# Patient Record
Sex: FEMALE | URBAN - METROPOLITAN AREA
[De-identification: names, ages, dates, MRNs, and addresses within clinical notes are randomized per-mention and may not be internally consistent; named-entity substitution may affect disease eponyms.]

---

## 2019-01-29 ENCOUNTER — APPOINTMENT (RX ONLY)
Dept: URBAN - METROPOLITAN AREA CLINIC 17 | Facility: CLINIC | Age: 69
Setting detail: DERMATOLOGY
End: 2019-01-29

## 2019-01-29 DIAGNOSIS — Z41.9 ENCOUNTER FOR PROCEDURE FOR PURPOSES OTHER THAN REMEDYING HEALTH STATE, UNSPECIFIED: ICD-10-CM

## 2019-01-29 PROCEDURE — ? OTHER (COSMETIC)

## 2019-01-29 PROCEDURE — ? MEDICAL CONSULTATION: FILLERS

## 2019-01-29 PROCEDURE — ? MEDICAL CONSULTATION: DYNAMIC RHYTIDES

## 2019-01-29 NOTE — PROCEDURE: OTHER (COSMETIC)
Other (Free Text): Discussed patients overall concerns which were mainly jowels and around the eye with bags. Discussed that we could utilize fillers to try and help out with this utilizing voluma, defyne, and refyne along with botox and dysport. I discussed I really think her better results would be had utilizing surgical approaches needing a face/neck lift as well as lower blepharoplasty. I gave her a quote of what I would utilize if doing non-invasive approach but recommended consideration of consult with Dr. Ibanez.
Detail Level: Zone

## 2019-03-21 ENCOUNTER — APPOINTMENT (RX ONLY)
Dept: URBAN - METROPOLITAN AREA CLINIC 17 | Facility: CLINIC | Age: 69
Setting detail: DERMATOLOGY
End: 2019-03-21

## 2019-03-21 DIAGNOSIS — Z41.9 ENCOUNTER FOR PROCEDURE FOR PURPOSES OTHER THAN REMEDYING HEALTH STATE, UNSPECIFIED: ICD-10-CM

## 2019-03-21 PROCEDURE — ? COSMETIC CONSULTATION: LASER RESURFACING

## 2019-03-21 PROCEDURE — ? PATIENT SPECIFIC COUNSELING

## 2019-03-21 PROCEDURE — ? COSMETIC CONSULTATION: BLEPHAROPLASTY

## 2019-05-28 ENCOUNTER — APPOINTMENT (RX ONLY)
Dept: URBAN - METROPOLITAN AREA CLINIC 41 | Facility: CLINIC | Age: 69
Setting detail: DERMATOLOGY
End: 2019-05-28

## 2019-05-28 DIAGNOSIS — Z41.9 ENCOUNTER FOR PROCEDURE FOR PURPOSES OTHER THAN REMEDYING HEALTH STATE, UNSPECIFIED: ICD-10-CM

## 2019-05-28 PROCEDURE — ? PATIENT SPECIFIC COUNSELING

## 2019-06-11 ENCOUNTER — APPOINTMENT (RX ONLY)
Dept: URBAN - METROPOLITAN AREA CLINIC 41 | Facility: CLINIC | Age: 69
Setting detail: DERMATOLOGY
End: 2019-06-11

## 2019-06-11 DIAGNOSIS — Z41.9 ENCOUNTER FOR PROCEDURE FOR PURPOSES OTHER THAN REMEDYING HEALTH STATE, UNSPECIFIED: ICD-10-CM

## 2019-06-11 PROCEDURE — ? LASER RESURFACING

## 2019-06-11 PROCEDURE — ? OTHER (COSMETIC)

## 2019-06-11 ASSESSMENT — LOCATION ZONE DERM: LOCATION ZONE: FACE

## 2019-06-11 ASSESSMENT — LOCATION DETAILED DESCRIPTION DERM: LOCATION DETAILED: RIGHT MEDIAL FOREHEAD

## 2019-06-11 ASSESSMENT — LOCATION SIMPLE DESCRIPTION DERM: LOCATION SIMPLE: RIGHT FOREHEAD

## 2019-06-11 NOTE — PROCEDURE: OTHER (COSMETIC)
Other (Free Text): Cosmetic :  Excess crepe-like skin distributed on the upper and lower eyelids\\n\\nPlan : Blepharoplasty.Procedure: bilateral upper and lower eyelid blepharoplasty\\n\\nLocation: upper and lower eyelids\\n\\nPreop Diagnosis: dermatochalasis\\n\\nPostop Diagnosis: same\\n\\nAnesthesia: IV sedation (see seperate note) and 1% lidocaine with epinephrine (10cc)\\n\\nComplications: none\\n\\nEstimated Blood Loss: minimal\\n\\nJustification: The patient presents for bilateral upper and lower eyelid blepharoplasty because of the following: fullness andfolds in the upper and lower eyelids and fullness in upper and lower eyelids.\\n\\nOperative Note:The risks, benefits and alternatives of blepharoplasty were discussed with the patient. Consent forms were signed. In the preoperative area with the patient positioned upright on the surgical chair, a marker pen was used to delineate the natural lid creases in the affected lids, and the amount of redundant skin and/or adipose tissue to be removed utilizing a pinch technique. An intravenous line was established and cardiac monitors were placed. IV sedation, subcutaneous infiltration of central aspect of the upper lids and the lateral canthus, and subcutaneous infiltration of central aspect of the lower lids and the lateral canthus were employed to obtain adequate anesthesia.  The patient was prepped, drapes were placed in the usual sterile fashion. A 15 blade was used to make a skin incision along the elliptical demarcation and to excise the skin. Electrocautery was used to achieve hemostasis. The supplemental oxygen was moved aside during the use of the Bovie to ensure they did not come in close contact with one another. The skin edges were closed using 6-0 fast absorbing gut. The subcilliary incision wascarried deep to identify the sub-orbital fat pads. Once located, the fat pads were meticulously removed. Care was taken to avoid the tear duct punctae. Hemostasis was achieved using electrocautery. The skin edges were closed with epidermal sutures 6-0 Fast gut. Vaseline was applied to the incision line. \\n\\Antonio the conclusion of the procedure, the patient left the operating room in good condition. The patient was advised to call immediately for any pain, lid swelling or tenderness, discharge, or loss of vision. The patient will return in one day for wound cleaning and postoperative exam
Detail Level: Simple
Detail Level: Zone
Other (Free Text): Cosmetic: loosened excess sagging skin at the neck\\n\\nPlan: Face Lift\\nPreoperative Diagnosis: Cosmetic\\nPostoperative Diagnosis: Cosmetic\\n\\nProcedure: Face Lift\\nSurgeon: Dr. Ibanez\\nAssistant: CAMRYN Elizalde RN, NASIR Lr, CMA, BRII Thompson, [facility]\\nAnesthesia: IV sedation, local anesthesia, and 1% lidocaine with epinephrine\\nVolume of Local Anesthesia: 15cc\\nEstimated Blood Loss: minimal\\nComplications: none\\n\\n\\nDescription of Procedure:\\nThe risks, benefits, expectations and alternatives of a face lift were discussed with the patient including, but not limited to, infection, bleeding, injury to nerves, blood vessels or other structures, delayed healing, visible scarring, contour irregularities, asymmetry, cosmetic dissatisfaction and unplanned return to the operating room. The patient read and signed the consent form and verbalized full understanding. The patient was brought to the operating room and the planned incision was drawn around the ear. The patient was placed in the standard supine position in the usual manner. After placement of monitors, anesthesia was achieved as above uneventfully. Surgical site preparation was performed with Hibiclens. The pre-auricualr retro-tragal incision was fashioned in the previously drawn line with a number 15 scalpel, carried posteriorly around the ear lobule to the post auricular sulcus. As necessary the incision was carried down the posterior hairline. A skin flap of the lateral cheek was raised superficial to the SMAS fascia around to the mastoid fascia posteriorly.The flap was dissected above the level of the SMAS until the limit of dissection was reached. Care was paid to avoidance of injury to the facial nerve and its branches, to the greater auricular and spinal accessory nerves. This sequence was conducted first on the right side an then the left. Using multiple 2-0 Ethibond sutures, the SMAS fascia was plicated and anchored superiorly to the zygomatic britney-osteum.\\n\\nThe skin was then redraped on both sides, creating a sharp, cervico-mental angle, while minimizing undue tension on the skin flaps. Key sutures were placed near the anterior hairline and the apex of the posterior hairline. A slit was fashioned for delivery of the ear lobule, being careful to avoid pixie ear deformity. The redundant skin was then carefully and precisely marked, and sharply resected to match the britney auricular incision. . Hemostasis was achieved with electrocautery. Preauricular closure was performed with deep dermal sutures of 4-0 Vicryl. The preauricualar epidermal closure was preformed with staples and 4-0 Polene. Postauricular closure was performed with deep dermal sutures of 4-0 Vicryl, 4-0 Prolene, and Staples. The skin throughout had good color and vascularity.  \\n\\nThe patient tolerated the procedure well without complications. She recovered well with out complications. She recovered from anesthesia and appropriate bandges and dressing were applied. She was discharged accompanied with a responsible adult.\\n\\nPostcare Instructions were given: Please follow the following instructions after your face lift. The patient should sleep in an upright position for the first 48 hours and avoid strenuous activity. Do not engage in lifting over 5 lbs. of weight. Refrain from aerobic exercises. Please call the office should you experience fevers (temperature over 101.5), chills, unusual swelling, severe pain, bleeding that does not stop with gentle pressure, foul smelling drainage, weakness of muscles of facial expression, nausea, vomiting, leg swelling, shortness of breath, chest pain or difficulty with urination.

## 2019-06-11 NOTE — PROCEDURE: LASER RESURFACING
Wavelength: 10,600nm
Anesthesia Type: 1% lidocaine with epinephrine
Power (Sneed): 17
Laser Type: eCO2
Pulse Duration (Usec): 0
Location Override (Will Override All Body Touches In The Note Only): the face
Treatment Number: 1
Consent: Written consent obtained, risks reviewed including but not limited to crusting, scabbing, blistering, scarring, darker or lighter pigmentary change, incomplete improvement of dyschromia, wrinkles, prolonged erythema and facial swelling, infection and bleeding.
Post-Care Instructions: I reviewed with the patient in detail post-care instructions. Patient should avoid sun until area fully healed. Written and verbal wound care instructions were given.
Energy(Mj): 120
Detail Level: Zone
Number Of Passes: 2

## 2019-06-12 ENCOUNTER — APPOINTMENT (RX ONLY)
Dept: URBAN - METROPOLITAN AREA CLINIC 41 | Facility: CLINIC | Age: 69
Setting detail: DERMATOLOGY
End: 2019-06-12

## 2019-06-12 VITALS — DIASTOLIC BLOOD PRESSURE: 72 MMHG | SYSTOLIC BLOOD PRESSURE: 142 MMHG | OXYGEN SATURATION: 94 % | HEART RATE: 64 BPM

## 2019-06-12 VITALS — SYSTOLIC BLOOD PRESSURE: 118 MMHG | DIASTOLIC BLOOD PRESSURE: 62 MMHG | HEART RATE: 79 BPM

## 2019-06-12 VITALS — SYSTOLIC BLOOD PRESSURE: 124 MMHG | DIASTOLIC BLOOD PRESSURE: 64 MMHG | HEART RATE: 66 BPM | OXYGEN SATURATION: 96 %

## 2019-06-12 VITALS — OXYGEN SATURATION: 96 % | DIASTOLIC BLOOD PRESSURE: 77 MMHG | SYSTOLIC BLOOD PRESSURE: 127 MMHG | HEART RATE: 66 BPM

## 2019-06-12 VITALS — SYSTOLIC BLOOD PRESSURE: 141 MMHG | DIASTOLIC BLOOD PRESSURE: 87 MMHG | OXYGEN SATURATION: 94 % | HEART RATE: 77 BPM

## 2019-06-12 DIAGNOSIS — Z41.9 ENCOUNTER FOR PROCEDURE FOR PURPOSES OTHER THAN REMEDYING HEALTH STATE, UNSPECIFIED: ICD-10-CM

## 2019-06-12 PROCEDURE — 99024 POSTOP FOLLOW-UP VISIT: CPT

## 2019-06-12 PROCEDURE — ? COSMETIC FOLLOW-UP

## 2019-06-13 ENCOUNTER — APPOINTMENT (RX ONLY)
Dept: URBAN - METROPOLITAN AREA CLINIC 41 | Facility: CLINIC | Age: 69
Setting detail: DERMATOLOGY
End: 2019-06-13

## 2019-06-13 DIAGNOSIS — Z41.9 ENCOUNTER FOR PROCEDURE FOR PURPOSES OTHER THAN REMEDYING HEALTH STATE, UNSPECIFIED: ICD-10-CM

## 2019-06-13 PROCEDURE — ? COSMETIC FOLLOW-UP

## 2019-06-13 PROCEDURE — 99024 POSTOP FOLLOW-UP VISIT: CPT

## 2019-06-18 ENCOUNTER — APPOINTMENT (RX ONLY)
Dept: URBAN - METROPOLITAN AREA CLINIC 41 | Facility: CLINIC | Age: 69
Setting detail: DERMATOLOGY
End: 2019-06-18

## 2019-06-18 DIAGNOSIS — Z41.9 ENCOUNTER FOR PROCEDURE FOR PURPOSES OTHER THAN REMEDYING HEALTH STATE, UNSPECIFIED: ICD-10-CM

## 2019-06-18 PROCEDURE — ? COSMETIC FOLLOW-UP

## 2019-06-18 PROCEDURE — 99024 POSTOP FOLLOW-UP VISIT: CPT

## 2019-07-18 ENCOUNTER — APPOINTMENT (RX ONLY)
Dept: URBAN - METROPOLITAN AREA CLINIC 17 | Facility: CLINIC | Age: 69
Setting detail: DERMATOLOGY
End: 2019-07-18

## 2019-07-18 DIAGNOSIS — Z41.9 ENCOUNTER FOR PROCEDURE FOR PURPOSES OTHER THAN REMEDYING HEALTH STATE, UNSPECIFIED: ICD-10-CM

## 2019-07-18 PROCEDURE — ? OTHER (COSMETIC)

## 2019-07-18 ASSESSMENT — LOCATION DETAILED DESCRIPTION DERM: LOCATION DETAILED: LEFT CENTRAL MALAR CHEEK

## 2019-07-18 ASSESSMENT — LOCATION ZONE DERM: LOCATION ZONE: FACE

## 2019-07-18 ASSESSMENT — LOCATION SIMPLE DESCRIPTION DERM: LOCATION SIMPLE: LEFT CHEEK

## 2019-07-18 NOTE — PROCEDURE: OTHER (COSMETIC)
Detail Level: Zone
Other (Free Text): Patient reports no concerns.  stepped in to assess and reassure that she is healing as expected. Her skin is light pink, which will continue to fade with time. She can use make up at this time and sunscreen is recommended or using hats to avoid sun exposure to the face.

## 2022-05-05 ENCOUNTER — APPOINTMENT (RX ONLY)
Dept: URBAN - METROPOLITAN AREA CLINIC 17 | Facility: CLINIC | Age: 72
Setting detail: DERMATOLOGY
End: 2022-05-05

## 2022-05-05 DIAGNOSIS — Z41.9 ENCOUNTER FOR PROCEDURE FOR PURPOSES OTHER THAN REMEDYING HEALTH STATE, UNSPECIFIED: ICD-10-CM

## 2022-05-05 PROCEDURE — ? COSMETIC CONSULTATION - FACELIFT

## 2022-05-05 PROCEDURE — ? COSMETIC CONSULTATION: LASER RESURFACING

## 2022-05-05 PROCEDURE — ? COSMETIC CONSULTATION: FILLERS

## 2022-05-05 ASSESSMENT — LOCATION DETAILED DESCRIPTION DERM
LOCATION DETAILED: RIGHT SUPERIOR MEDIAL BUCCAL CHEEK
LOCATION DETAILED: RIGHT SUPERIOR LATERAL MALAR CHEEK
LOCATION DETAILED: LEFT SUPERIOR LATERAL MALAR CHEEK
LOCATION DETAILED: INFERIOR MID FOREHEAD

## 2022-05-05 ASSESSMENT — LOCATION SIMPLE DESCRIPTION DERM
LOCATION SIMPLE: RIGHT CHEEK
LOCATION SIMPLE: RIGHT CHEEK
LOCATION SIMPLE: LEFT CHEEK
LOCATION SIMPLE: INFERIOR FOREHEAD

## 2022-05-05 ASSESSMENT — LOCATION ZONE DERM
LOCATION ZONE: FACE
LOCATION ZONE: FACE

## 2022-05-05 NOTE — PROCEDURE: COSMETIC CONSULTATION: LASER RESURFACING
Detail Level: Zone
Patient Specific Counseling (Will Not Stick From Patient To Patient): Macario laser around mouth

## 2022-08-04 ENCOUNTER — APPOINTMENT (RX ONLY)
Dept: URBAN - METROPOLITAN AREA CLINIC 17 | Facility: CLINIC | Age: 72
Setting detail: DERMATOLOGY
End: 2022-08-04

## 2022-08-04 VITALS — WEIGHT: 160 LBS | HEIGHT: 65 IN

## 2022-08-04 DIAGNOSIS — Z41.9 ENCOUNTER FOR PROCEDURE FOR PURPOSES OTHER THAN REMEDYING HEALTH STATE, UNSPECIFIED: ICD-10-CM

## 2022-08-04 PROCEDURE — ? PRESCRIPTION

## 2022-08-04 PROCEDURE — ? PATIENT SPECIFIC COUNSELING

## 2022-08-04 RX ORDER — VALACYCLOVIR HYDROCHLORIDE 500 MG/1
TABLET, FILM COATED ORAL
Qty: 14 | Refills: 0 | Status: ERX | COMMUNITY
Start: 2022-08-04

## 2022-08-04 RX ORDER — ONDANSETRON 4 MG/1
TABLET, ORALLY DISINTEGRATING ORAL
Qty: 20 | Refills: 0 | Status: ERX | COMMUNITY
Start: 2022-08-04

## 2022-08-04 RX ORDER — CEPHALEXIN 500 MG/1
TABLET ORAL TID
Qty: 14 | Refills: 0 | Status: ERX | COMMUNITY
Start: 2022-08-04

## 2022-08-04 RX ADMIN — ONDANSETRON: 4 TABLET, ORALLY DISINTEGRATING ORAL at 00:00

## 2022-08-04 RX ADMIN — VALACYCLOVIR HYDROCHLORIDE: 500 TABLET, FILM COATED ORAL at 00:00

## 2022-08-04 RX ADMIN — CEPHALEXIN: 500 TABLET ORAL at 00:00

## 2022-08-04 NOTE — HPI: COSMETIC FOLLOW UP
What Condition Are We Treating?: Patient presents for a cosmetic pre op for a facelift and perioral sharplan to be completed on 8/16/2022 at 1PM.
What Procedure Did We Perform At The Last Visit?: Cosmetic Consultation

## 2022-08-16 ENCOUNTER — APPOINTMENT (RX ONLY)
Dept: URBAN - METROPOLITAN AREA CLINIC 41 | Facility: CLINIC | Age: 72
Setting detail: DERMATOLOGY
End: 2022-08-16

## 2022-08-16 VITALS
DIASTOLIC BLOOD PRESSURE: 69 MMHG | SYSTOLIC BLOOD PRESSURE: 121 MMHG | RESPIRATION RATE: 20 BRPM | HEART RATE: 69 BPM | OXYGEN SATURATION: 99 %

## 2022-08-16 VITALS
RESPIRATION RATE: 18 BRPM | OXYGEN SATURATION: 95 % | HEART RATE: 78 BPM | SYSTOLIC BLOOD PRESSURE: 126 MMHG | DIASTOLIC BLOOD PRESSURE: 74 MMHG

## 2022-08-16 VITALS
OXYGEN SATURATION: 96 % | RESPIRATION RATE: 18 BRPM | SYSTOLIC BLOOD PRESSURE: 132 MMHG | DIASTOLIC BLOOD PRESSURE: 59 MMHG | HEART RATE: 62 BPM

## 2022-08-16 VITALS
HEART RATE: 62 BPM | SYSTOLIC BLOOD PRESSURE: 146 MMHG | OXYGEN SATURATION: 96 % | DIASTOLIC BLOOD PRESSURE: 64 MMHG | RESPIRATION RATE: 16 BRPM

## 2022-08-16 DIAGNOSIS — Z41.9 ENCOUNTER FOR PROCEDURE FOR PURPOSES OTHER THAN REMEDYING HEALTH STATE, UNSPECIFIED: ICD-10-CM

## 2022-08-16 PROCEDURE — ? OTHER (COSMETIC)

## 2022-08-16 NOTE — PROCEDURE: OTHER (COSMETIC)
Other (Free Text): Cosmetic: loosened excess sagging skin at the neck\\n\\nPlan: Face Lift\\nPreoperative Diagnosis: Cosmetic\\nPostoperative Diagnosis: Cosmetic\\n\\nProcedure: Face Lift\\nSurgeon: Dr. Ibanez\\nAssistant: CAMRYN Elizalde RN, NASIR Lr, CMA, BRII Thompson, [facility]\\nAnesthesia: IV sedation, local anesthesia, and 1% lidocaine with epinephrine\\nVolume of Local Anesthesia: 15cc\\nEstimated Blood Loss: minimal\\nComplications: none\\n\\n\\nDescription of Procedure:\\nThe risks, benefits, expectations and alternatives of a face lift were discussed with the patient including, but not limited to, infection, bleeding, injury to nerves, blood vessels or other structures, delayed healing, visible scarring, contour irregularities, asymmetry, cosmetic dissatisfaction and unplanned return to the operating room. The patient read and signed the consent form and verbalized full understanding. The patient was brought to the operating room and the planned incision was drawn around the ear. The patient was placed in the standard supine position in the usual manner. After placement of monitors, anesthesia was achieved as above uneventfully. Surgical site preparation was performed with Hibiclens. The pre-auricualr retro-tragal incision was fashioned in the previously drawn line with a number 15 scalpel, carried posteriorly around the ear lobule to the post auricular sulcus. As necessary the incision was carried down the posterior hairline. A skin flap of the lateral cheek was raised superficial to the SMAS fascia around to the mastoid fascia posteriorly.The flap was dissected above the level of the SMAS until the limit of dissection was reached. Care was paid to avoidance of injury to the facial nerve and its branches, to the greater auricular and spinal accessory nerves. This sequence was conducted first on the right side an then the left. Using multiple 2-0 Ethibond sutures, the SMAS fascia was plicated and anchored superiorly to the zygomatic britney-osteum.\\n\\nThe skin was then redraped on both sides, creating a sharp, cervico-mental angle, while minimizing undue tension on the skin flaps. Key sutures were placed near the anterior hairline and the apex of the posterior hairline. A slit was fashioned for delivery of the ear lobule, being careful to avoid pixie ear deformity. The redundant skin was then carefully and precisely marked, and sharply resected to match the britney auricular incision. . Hemostasis was achieved with electrocautery. Preauricular closure was performed with deep dermal sutures of 4-0 Vicryl. The preauricualar epidermal closure was preformed with staples and 4-0 Prolene. Postauricular closure was performed with deep dermal sutures of 4-0 Vicryl, 4-0 Prolene, and Staples. The skin throughout had good color and vascularity.  \\n\\nThe patient tolerated the procedure well without complications. She recovered well with out complications. She recovered from anesthesia and appropriate bandges and dressing were applied. She was discharged accompanied with a responsible adult.\\n\\nPostcare Instructions were given: Please follow the following instructions after your face lift. The patient should sleep in an upright position for the first 48 hours and avoid strenuous activity. Do not engage in lifting over 5 lbs. of weight. Refrain from aerobic exercises. Please call the office should you experience fevers (temperature over 101.5), chills, unusual swelling, severe pain, bleeding that does not stop with gentle pressure, foul smelling drainage, weakness of muscles of facial expression, nausea, vomiting, leg swelling, shortness of breath, chest pain or difficulty with urination.
Detail Level: Zone
Other (Free Text): Cosmetic: photoaging distributed on the full face\\n\\nPlan: Laser Resurfacing\\nTreatment #: 1\\nLaser: eCO2\\nMode: Fractionated\\nWavelength: 10,600nm\\nPulse Energy: 150mJ\\nTotal Energy: 000kJ\\n\\nWritten consent obtained, risks reviewed including but not limited to crusting, scabbing, blistering, scarring, darker or lighter pigmentary change, incomplete improvement of dyschromia, wrinkles, prolonged erythema and facial swelling, infection and bleeding. Anesthesia was achieved with local infiltration-1% lidocaine with epinephrine( 5cc) and IV sedation. Protective eye shields were placed on the patient's eyes and all OR personnel wore appropriate eye wear. Laser resurfacing was performed to the the face with 2 passes. Eye shields were removed. The patient tolerated the procedure well without complications. I reviewed with the patient in detail post-care instructions. Patient should avoid sun until area fully healed. Written and verbal wound care instructions were given.\\n\\nVitals\\nBP:\\nP:\\nO2:

## 2022-08-17 ENCOUNTER — APPOINTMENT (RX ONLY)
Dept: URBAN - METROPOLITAN AREA CLINIC 41 | Facility: CLINIC | Age: 72
Setting detail: DERMATOLOGY
End: 2022-08-17

## 2022-08-17 DIAGNOSIS — Z41.9 ENCOUNTER FOR PROCEDURE FOR PURPOSES OTHER THAN REMEDYING HEALTH STATE, UNSPECIFIED: ICD-10-CM

## 2022-08-17 PROCEDURE — ? COSMETIC FOLLOW-UP

## 2022-08-17 NOTE — HPI: COSMETIC FOLLOW UP
How Did You Tolerate The Procedure?: other
In the emergency department today for abdominal pain, here you had a ultrasound showing stones in your gallbladder but no acute gallbladder stone emergency or cholecystitis.  Please follow-up with the surgeon whose number we have attached here for removal of your gallbladder.  Please return if you have worsening pain, nausea, vomiting, other symptoms that are concerning to you.  
What Condition Are We Treating?: 1 day post operative
What Procedure Did We Perform At The Last Visit?: facelift, britney-oral and britney-ocular CO2 sharplan laser resurfacing

## 2022-08-17 NOTE — PROCEDURE: COSMETIC FOLLOW-UP
Detail Level: Detailed
Global Improvement: Good
Side Effects Or Complications: None
Side Effects Override (Free Text): right preauricular suture line opened due to swelling, patient experienced nausea after taking prescribed pain medication and Keflex
Treatment Override (Free Text): britney-oral and britney-ocular CO2 Sharplan laser resurfacing
Treatment Override (Free Text): facelift

## 2022-08-23 ENCOUNTER — APPOINTMENT (RX ONLY)
Dept: URBAN - METROPOLITAN AREA CLINIC 41 | Facility: CLINIC | Age: 72
Setting detail: DERMATOLOGY
End: 2022-08-23

## 2022-08-23 DIAGNOSIS — Z41.9 ENCOUNTER FOR PROCEDURE FOR PURPOSES OTHER THAN REMEDYING HEALTH STATE, UNSPECIFIED: ICD-10-CM

## 2022-08-23 DIAGNOSIS — Z48.02 ENCOUNTER FOR REMOVAL OF SUTURES: ICD-10-CM

## 2022-08-23 PROCEDURE — ? SUTURE REMOVAL (GLOBAL PERIOD)

## 2022-08-23 PROCEDURE — 99024 POSTOP FOLLOW-UP VISIT: CPT

## 2022-08-23 PROCEDURE — ? COUNSELING

## 2022-08-23 PROCEDURE — ? COSMETIC FOLLOW-UP

## 2022-08-23 ASSESSMENT — LOCATION ZONE DERM: LOCATION ZONE: FACE

## 2022-08-23 ASSESSMENT — LOCATION SIMPLE DESCRIPTION DERM: LOCATION SIMPLE: RIGHT CHEEK

## 2022-08-23 ASSESSMENT — LOCATION DETAILED DESCRIPTION DERM: LOCATION DETAILED: RIGHT MID PREAURICULAR CHEEK

## 2022-08-23 NOTE — PROCEDURE: COSMETIC FOLLOW-UP
Side Effects Override (Free Text): scabbing, build up of ointment without proper wound care observed
Treatment Override (Free Text): facelift
Detail Level: Detailed
Global Improvement: Good
Treatment Override (Free Text): britney-oral and britney ocular sharplan laser resurfacing

## 2022-08-23 NOTE — HPI: COSMETIC FOLLOW UP
How Did You Tolerate The Procedure?: other
What Condition Are We Treating?: 1 week
What Procedure Did We Perform At The Last Visit?: Facelift, britney-oral and britney-ocular CO2 laser resurfacing

## 2022-08-23 NOTE — PROCEDURE: SUTURE REMOVAL (GLOBAL PERIOD)
Body Location Override (Optional - Billing Will Still Be Based On Selected Body Map Location If Applicable): bilateral ears
Detail Level: Simple
Add 49902 Cpt? (Important Note: In 2017 The Use Of 66041 Is Being Tracked By Cms To Determine Future Global Period Reimbursement For Global Periods): yes

## 2022-09-01 ENCOUNTER — APPOINTMENT (RX ONLY)
Dept: URBAN - METROPOLITAN AREA CLINIC 17 | Facility: CLINIC | Age: 72
Setting detail: DERMATOLOGY
End: 2022-09-01

## 2022-09-01 DIAGNOSIS — Z41.9 ENCOUNTER FOR PROCEDURE FOR PURPOSES OTHER THAN REMEDYING HEALTH STATE, UNSPECIFIED: ICD-10-CM

## 2022-09-01 PROCEDURE — ? ADDITIONAL NOTES

## 2022-09-01 PROCEDURE — ? COSMETIC FOLLOW-UP

## 2022-09-01 NOTE — PROCEDURE: ADDITIONAL NOTES
Render Risk Assessment In Note?: yes
Additional Notes: Discussed switching to moisturize with a touch of Vaseline. Gave CeraVe moisturizing cream samples. Follow up in 1 month.
Detail Level: Detailed

## 2022-09-01 NOTE — PROCEDURE: COSMETIC FOLLOW-UP
Global Improvement: Good
Treatment Override (Free Text): facelift
Patient Satisfaction: Pleased
Detail Level: Detailed
Side Effects Or Complications: None
Treatment Override (Free Text): britney-oral and britney ocular sharplan laser resurfacing

## 2022-09-01 NOTE — HPI: COSMETIC FOLLOW UP
How Did You Tolerate The Procedure?: other
What Condition Are We Treating?: Face lift
What Procedure Did We Perform At The Last Visit?: Face,fit

## 2022-09-29 ENCOUNTER — APPOINTMENT (RX ONLY)
Dept: URBAN - METROPOLITAN AREA CLINIC 17 | Facility: CLINIC | Age: 72
Setting detail: DERMATOLOGY
End: 2022-09-29

## 2022-09-29 DIAGNOSIS — Z41.9 ENCOUNTER FOR PROCEDURE FOR PURPOSES OTHER THAN REMEDYING HEALTH STATE, UNSPECIFIED: ICD-10-CM

## 2022-09-29 PROCEDURE — ? COSMETIC FOLLOW-UP

## 2022-09-29 NOTE — HPI: COSMETIC FOLLOW UP
How Did You Tolerate The Procedure?: well, without problems
What Condition Are We Treating?: 1.5 month follow up
What Procedure Did We Perform At The Last Visit?: Facelift and under eye CO2 laser resurfacing

## 2022-09-29 NOTE — PROCEDURE: COSMETIC FOLLOW-UP
Patient Satisfaction: Unsure
Treatment Override (Free Text): under eye CO2 laser resurfacing
Side Effects Override (Free Text): patient reports she is still numb from facelift, has a bump behind left ear, and feels her smile is uneven. The patient was reassured that her healing is WNL.
Side Effects Override (Free Text): the patient is still pink but healing WNL
Global Improvement: Good
Treatment Override (Free Text): facelift
Detail Level: Detailed

## 2023-03-16 ENCOUNTER — APPOINTMENT (RX ONLY)
Dept: URBAN - METROPOLITAN AREA CLINIC 17 | Facility: CLINIC | Age: 73
Setting detail: DERMATOLOGY
End: 2023-03-16

## 2023-03-16 DIAGNOSIS — Z41.9 ENCOUNTER FOR PROCEDURE FOR PURPOSES OTHER THAN REMEDYING HEALTH STATE, UNSPECIFIED: ICD-10-CM

## 2023-03-16 PROCEDURE — ? COSMETIC FOLLOW-UP

## 2023-03-16 PROCEDURE — ? ADDITIONAL NOTES

## 2023-03-16 NOTE — HPI: COSMETIC FOLLOW UP
How Did You Tolerate The Procedure?: well, without problems
What Condition Are We Treating?: 6 months
What Procedure Did We Perform At The Last Visit?: Facelift and periocular and perioral CO2 laser resurfacing.

## 2023-03-16 NOTE — PROCEDURE: ADDITIONAL NOTES
Render Risk Assessment In Note?: yes
Detail Level: Detailed
Additional Notes: Right temple has some puffiness may need revision down the road. Continue monitoring for at least a year. \\nDiscussed filler to deep wrinkles if she would like to decrease the appearance.

## 2023-03-16 NOTE — PROCEDURE: COSMETIC FOLLOW-UP
Patient Satisfaction: Very pleased
Global Improvement: Very Good
Treatment (Optional): Laser Resurfacing
Side Effects Or Complications: None
Treatment Override (Free Text): facelift
Detail Level: Detailed